# Patient Record
Sex: MALE | Race: WHITE | NOT HISPANIC OR LATINO | Employment: OTHER | URBAN - METROPOLITAN AREA
[De-identification: names, ages, dates, MRNs, and addresses within clinical notes are randomized per-mention and may not be internally consistent; named-entity substitution may affect disease eponyms.]

---

## 2024-05-24 ENCOUNTER — OFFICE VISIT (OUTPATIENT)
Dept: UROLOGY | Facility: CLINIC | Age: 54
End: 2024-05-24
Payer: COMMERCIAL

## 2024-05-24 VITALS
SYSTOLIC BLOOD PRESSURE: 107 MMHG | HEART RATE: 98 BPM | WEIGHT: 176.13 LBS | HEIGHT: 69 IN | BODY MASS INDEX: 26.09 KG/M2 | DIASTOLIC BLOOD PRESSURE: 72 MMHG | RESPIRATION RATE: 16 BRPM

## 2024-05-24 DIAGNOSIS — N40.1 ENLARGED PROSTATE WITH URINARY OBSTRUCTION: ICD-10-CM

## 2024-05-24 DIAGNOSIS — N13.8 ENLARGED PROSTATE WITH URINARY OBSTRUCTION: ICD-10-CM

## 2024-05-24 DIAGNOSIS — R97.20 ELEVATED PSA: Primary | ICD-10-CM

## 2024-05-24 DIAGNOSIS — Z80.42 FAMILY HISTORY OF PROSTATE CANCER: ICD-10-CM

## 2024-05-24 DIAGNOSIS — R35.1 NOCTURIA: ICD-10-CM

## 2024-05-24 LAB
BILIRUB UR QL STRIP: NEGATIVE
GLUCOSE UR QL STRIP: NEGATIVE
KETONES UR QL STRIP: NEGATIVE
LEUKOCYTE ESTERASE UR QL STRIP: NEGATIVE
PH, POC UA: 7
POC BLOOD, URINE: NEGATIVE
POC NITRATES, URINE: NEGATIVE
PROT UR QL STRIP: NEGATIVE
SP GR UR STRIP: 1.02 (ref 1–1.03)
UROBILINOGEN UR STRIP-ACNC: 0.2 (ref 0.3–2.2)

## 2024-05-24 PROCEDURE — 3008F BODY MASS INDEX DOCD: CPT | Mod: CPTII,S$GLB,, | Performed by: UROLOGY

## 2024-05-24 PROCEDURE — 1159F MED LIST DOCD IN RCRD: CPT | Mod: CPTII,S$GLB,, | Performed by: UROLOGY

## 2024-05-24 PROCEDURE — 3078F DIAST BP <80 MM HG: CPT | Mod: CPTII,S$GLB,, | Performed by: UROLOGY

## 2024-05-24 PROCEDURE — 3074F SYST BP LT 130 MM HG: CPT | Mod: CPTII,S$GLB,, | Performed by: UROLOGY

## 2024-05-24 PROCEDURE — 81003 URINALYSIS AUTO W/O SCOPE: CPT | Mod: QW,S$GLB,, | Performed by: UROLOGY

## 2024-05-24 PROCEDURE — 99214 OFFICE O/P EST MOD 30 MIN: CPT | Mod: S$GLB,,, | Performed by: UROLOGY

## 2024-05-24 PROCEDURE — 99999 PR PBB SHADOW E&M-NEW PATIENT-LVL IV: CPT | Mod: PBBFAC,,, | Performed by: UROLOGY

## 2024-05-24 RX ORDER — TRIAMTERENE/HYDROCHLOROTHIAZID 37.5-25 MG
1 TABLET ORAL DAILY
COMMUNITY

## 2024-05-24 RX ORDER — AMLODIPINE BESYLATE 2.5 MG/1
1 TABLET ORAL DAILY
COMMUNITY

## 2024-05-24 RX ORDER — PRAVASTATIN SODIUM 40 MG/1
1 TABLET ORAL DAILY
COMMUNITY
Start: 2024-04-23

## 2024-05-24 RX ORDER — LEVOTHYROXINE SODIUM 88 UG/1
1 TABLET ORAL DAILY
COMMUNITY

## 2024-05-24 NOTE — PROGRESS NOTES
Subjective:       Patient ID: Jonah Grijalva is a 54 y.o. male.    Chief Complaint: Elevated PSA      History of Present Illness:     Mr Grijalva has an elevated PSA that has mildly decreased from last year.  Dr Shrestha is his PCP.  He has never had a prostate MRI or a prostate biopsy done.  He is not taking a prostate medication.  He has a slow urinary stream.  He usually feels that he is emptying his bladder.  Occasional intermittent urinary stream.  Nocturia X 4.  He has family history of prostate cancer in his grandfather.         Past Medical History:   Diagnosis Date    Elevated PSA     Hypertension     Thyroid disease      Family History   Problem Relation Name Age of Onset    Hypertension Father      Alzheimer's disease Father      No Known Problems Mother      No Known Problems Maternal Aunt      No Known Problems Maternal Uncle      No Known Problems Paternal Aunt      No Known Problems Paternal Uncle      Heart disease Paternal Grandmother      No Known Problems Paternal Grandfather       Social History     Socioeconomic History    Marital status:    Tobacco Use    Smoking status: Never    Smokeless tobacco: Never   Substance and Sexual Activity    Alcohol use: Yes     Alcohol/week: 1.0 standard drink of alcohol     Types: 1 Cans of beer per week    Drug use: Never    Sexual activity: Yes     Partners: Female     Birth control/protection: None     Outpatient Encounter Medications as of 5/24/2024   Medication Sig Dispense Refill    amLODIPine (NORVASC) 2.5 MG tablet Take 1 tablet by mouth once daily.      betahistine HCl (BETAHISTINE, BULK, MISC) Betahistine 12 mg TAKE 1 CAPSULE BY MOUTH THREE TIMES DAILY Dispense 90 capsule      levothyroxine (SYNTHROID) 88 MCG tablet Take 1 tablet by mouth once daily.      pravastatin (PRAVACHOL) 40 MG tablet Take 1 tablet by mouth once daily.      triamterene-hydrochlorothiazide 37.5-25 mg (MAXZIDE-25) 37.5-25 mg per tablet Take 1 tablet by mouth once daily.    "    No facility-administered encounter medications on file as of 5/24/2024.        Review of Systems   Constitutional:  Negative for chills and fever.   Respiratory:  Negative for shortness of breath.    Cardiovascular:  Negative for chest pain.   Gastrointestinal:  Negative for nausea and vomiting.   Genitourinary:  Negative for hematuria.   Musculoskeletal:  Negative for back pain.   Skin:  Negative for rash.   Neurological:  Negative for dizziness.   Psychiatric/Behavioral:  Negative for agitation.        Objective:     /72 (BP Location: Right arm, Patient Position: Sitting)   Pulse 98   Resp 16   Ht 5' 9" (1.753 m)   Wt 79.9 kg (176 lb 2.4 oz)   BMI 26.01 kg/m²     Physical Exam  Constitutional:       Appearance: Normal appearance.   Pulmonary:      Effort: Pulmonary effort is normal.   Abdominal:      Palpations: Abdomen is soft.   Genitourinary:     Comments: Prostate exam deferred  Neurological:      Mental Status: He is alert and oriented to person, place, and time.   Psychiatric:         Mood and Affect: Mood normal.         Office Visit on 05/24/2024   Component Date Value Ref Range Status    POC Blood, Urine 05/24/2024 Negative  Negative Final    POC Bilirubin, Urine 05/24/2024 Negative  Negative Final    POC Urobilinogen, Urine 05/24/2024 0.2 (A)  0.3 - 2.2 Final    POC Ketones, Urine 05/24/2024 Negative  Negative Final    POC Protein, Urine 05/24/2024 Negative  Negative Final    POC Nitrates, Urine 05/24/2024 Negative  Negative Final    POC Glucose, Urine 05/24/2024 Negative  Negative Final    pH, UA 05/24/2024 7.0   Final    POC Specific Gravity, Urine 05/24/2024 1.020  1.003 - 1.029 Final    POC Leukocytes, Urine 05/24/2024 Negative  Negative Final        No results found for this or any previous visit (from the past 8760 hour(s)).     Assessment:       1. Elevated PSA    2. Enlarged prostate with urinary obstruction    3. Nocturia    4. Family history of prostate cancer      Plan: "     Orders Placed This Encounter    MRI Prostate W W/O Contrast    PSA, Total and Free    CREATININE, SERUM    POCT Urinalysis, Dipstick, Automated, W/O Scope          3-12-24  PSA 4.750    4-11-23  PSA 4.61    7-22-22  PSA 4.61    7-14-21  PSA 3.70    3-12-24  Cr 1.33.  GFR 63.    3-12-24  HgbA1c 5.6    I reviewed the above lab results.         Assessment:  - Elevated PSA.  - BPH with LUTS that are not too bothersome.  - Nocturia.  - Family history of prostate cancer in his grandfather.     Plan:  - I discussed options with him regarding his elevated PSA and the mutual decision was made to proceed with a prostate MRI.  - I discussed options with him regarding starting him on a prostate medication such as alfuzosin for his BPH with LUTS and he wants to hold off on starting on a prostate medication at this time.  - He was given a lab order today on 5-24-24 for a PSA and a creatinine to both be done on 5-27-24.  He says he wants to do the lab work at his PCP's office.  - Prostate MRI followed by an office visit.

## 2024-06-05 ENCOUNTER — TELEPHONE (OUTPATIENT)
Dept: UROLOGY | Facility: CLINIC | Age: 54
End: 2024-06-05
Payer: COMMERCIAL

## 2024-06-05 DIAGNOSIS — R97.20 ELEVATED PSA: Primary | ICD-10-CM

## 2024-06-05 NOTE — TELEPHONE ENCOUNTER
Labs done by pcp, labs in chart.     ----- Message from Keo Ely sent at 6/5/2024  9:28 AM CDT -----  Good morning,    Can you please put an order in for a STAT creatinine for Mr. Grijalva to complete before his MRI.    Thank you,  Keo  Radiology Dept.

## 2024-06-11 ENCOUNTER — HOSPITAL ENCOUNTER (OUTPATIENT)
Dept: RADIOLOGY | Facility: HOSPITAL | Age: 54
Discharge: HOME | End: 2024-06-11
Attending: UROLOGY
Payer: COMMERCIAL

## 2024-06-11 DIAGNOSIS — R97.20 ELEVATED PSA: ICD-10-CM

## 2024-06-11 PROCEDURE — A9585 GADOBUTROL INJECTION: HCPCS | Mod: PN | Performed by: UROLOGY

## 2024-06-11 PROCEDURE — 25500020 PHARM REV CODE 255: Mod: PN | Performed by: UROLOGY

## 2024-06-11 PROCEDURE — 72197 MRI PELVIS W/O & W/DYE: CPT | Mod: 26,,, | Performed by: RADIOLOGY

## 2024-06-11 PROCEDURE — 72197 MRI PELVIS W/O & W/DYE: CPT | Mod: TC,PN

## 2024-06-11 RX ORDER — GADOBUTROL 604.72 MG/ML
8 INJECTION INTRAVENOUS
Status: COMPLETED | OUTPATIENT
Start: 2024-06-11 | End: 2024-06-11

## 2024-06-11 RX ADMIN — GADOBUTROL 8 ML: 604.72 INJECTION INTRAVENOUS at 01:06

## 2024-06-14 ENCOUNTER — OFFICE VISIT (OUTPATIENT)
Dept: UROLOGY | Facility: CLINIC | Age: 54
End: 2024-06-14
Payer: COMMERCIAL

## 2024-06-14 VITALS
HEIGHT: 69 IN | BODY MASS INDEX: 26.02 KG/M2 | SYSTOLIC BLOOD PRESSURE: 102 MMHG | WEIGHT: 175.69 LBS | HEART RATE: 65 BPM | DIASTOLIC BLOOD PRESSURE: 71 MMHG

## 2024-06-14 DIAGNOSIS — N40.1 BPH WITH OBSTRUCTION/LOWER URINARY TRACT SYMPTOMS: ICD-10-CM

## 2024-06-14 DIAGNOSIS — R97.20 ELEVATED PSA: Primary | ICD-10-CM

## 2024-06-14 DIAGNOSIS — N13.8 BPH WITH OBSTRUCTION/LOWER URINARY TRACT SYMPTOMS: ICD-10-CM

## 2024-06-14 DIAGNOSIS — Z80.42 FAMILY HISTORY OF PROSTATE CANCER: ICD-10-CM

## 2024-06-14 PROCEDURE — 1159F MED LIST DOCD IN RCRD: CPT | Mod: CPTII,S$GLB,, | Performed by: UROLOGY

## 2024-06-14 PROCEDURE — 99999 PR PBB SHADOW E&M-EST. PATIENT-LVL III: CPT | Mod: PBBFAC,,, | Performed by: UROLOGY

## 2024-06-14 PROCEDURE — 99214 OFFICE O/P EST MOD 30 MIN: CPT | Mod: S$GLB,,, | Performed by: UROLOGY

## 2024-06-14 PROCEDURE — 3074F SYST BP LT 130 MM HG: CPT | Mod: CPTII,S$GLB,, | Performed by: UROLOGY

## 2024-06-14 PROCEDURE — 3078F DIAST BP <80 MM HG: CPT | Mod: CPTII,S$GLB,, | Performed by: UROLOGY

## 2024-06-14 PROCEDURE — 3008F BODY MASS INDEX DOCD: CPT | Mod: CPTII,S$GLB,, | Performed by: UROLOGY

## 2024-06-14 NOTE — PROGRESS NOTES
Subjective:       Patient ID: Jonah Grijalva is a 54 y.o. male.    Chief Complaint: Follow-up (/)      History of Present Illness:     Mr Grijalva has an elevated PSA.  Dr Shrestha is his PCP.  He underwent a prostate MRI on 6-11-24 that showed no focal suspicious lesion in the prostate gland to localize a clinically significant prostate cancer.  PI-RADS 2.  Total prostate volume is 54.53 cc.  He has never had a prostate biopsy.  He is not taking a prostate medication.  Occasional slow urinary stream.  He usually feels that he is emptying his bladder.  Occasional intermittent urinary stream.  Nocturia X 4.  He has family history of prostate cancer in his grandfather.              Past Medical History:   Diagnosis Date    Elevated PSA     Hypertension     Thyroid disease      Family History   Problem Relation Name Age of Onset    Hypertension Father      Alzheimer's disease Father      No Known Problems Mother      No Known Problems Maternal Aunt      No Known Problems Maternal Uncle      No Known Problems Paternal Aunt      No Known Problems Paternal Uncle      Heart disease Paternal Grandmother      No Known Problems Paternal Grandfather       Social History     Socioeconomic History    Marital status:    Tobacco Use    Smoking status: Never    Smokeless tobacco: Never   Substance and Sexual Activity    Alcohol use: Yes     Alcohol/week: 1.0 standard drink of alcohol     Types: 1 Cans of beer per week    Drug use: Never    Sexual activity: Yes     Partners: Female     Birth control/protection: None     Outpatient Encounter Medications as of 6/14/2024   Medication Sig Dispense Refill    amLODIPine (NORVASC) 2.5 MG tablet Take 1 tablet by mouth once daily.      betahistine HCl (BETAHISTINE, BULK, MISC) Betahistine 12 mg TAKE 1 CAPSULE BY MOUTH THREE TIMES DAILY Dispense 90 capsule      levothyroxine (SYNTHROID) 88 MCG tablet Take 1 tablet by mouth once daily.      pravastatin (PRAVACHOL) 40 MG tablet Take 1 tablet  "by mouth once daily.      triamterene-hydrochlorothiazide 37.5-25 mg (MAXZIDE-25) 37.5-25 mg per tablet Take 1 tablet by mouth once daily.       No facility-administered encounter medications on file as of 6/14/2024.        Review of Systems   Constitutional:  Negative for chills and fever.   Respiratory:  Negative for shortness of breath.    Cardiovascular:  Negative for chest pain.   Gastrointestinal:  Negative for nausea and vomiting.   Musculoskeletal:  Negative for back pain.   Skin:  Negative for rash.   Neurological:  Negative for dizziness.   Psychiatric/Behavioral:  Negative for agitation.        Objective:     /71 (BP Location: Right arm, Patient Position: Sitting)   Pulse 65   Ht 5' 9" (1.753 m)   Wt 79.7 kg (175 lb 11.3 oz)   BMI 25.95 kg/m²     Physical Exam  Constitutional:       Appearance: Normal appearance.   Pulmonary:      Effort: Pulmonary effort is normal.   Abdominal:      Palpations: Abdomen is soft.   Neurological:      Mental Status: He is alert and oriented to person, place, and time.   Psychiatric:         Mood and Affect: Mood normal.         No visits with results within 1 Day(s) from this visit.   Latest known visit with results is:   Office Visit on 05/24/2024   Component Date Value Ref Range Status    POC Blood, Urine 05/24/2024 Negative  Negative Final    POC Bilirubin, Urine 05/24/2024 Negative  Negative Final    POC Urobilinogen, Urine 05/24/2024 0.2 (A)  0.3 - 2.2 Final    POC Ketones, Urine 05/24/2024 Negative  Negative Final    POC Protein, Urine 05/24/2024 Negative  Negative Final    POC Nitrates, Urine 05/24/2024 Negative  Negative Final    POC Glucose, Urine 05/24/2024 Negative  Negative Final    pH, UA 05/24/2024 7.0   Final    POC Specific Gravity, Urine 05/24/2024 1.020  1.003 - 1.029 Final    POC Leukocytes, Urine 05/24/2024 Negative  Negative Final        No results found for this or any previous visit (from the past 8760 hour(s)).     Assessment:       1. " Elevated PSA    2. BPH with obstruction/lower urinary tract symptoms    3. Family history of prostate cancer      Plan:     Orders Placed This Encounter    PSA, Total and Free          6-11-24  Prostate MRI.  See report.  No focal suspicious lesion in the prostate gland to localize a clinically significant prostate cancer.  PI-RADS 2.  Total prostate volume is 54.53 cc.  No pelvic lymphadenopathy.    I reviewed the above imaging result.         5-28-24  PSA 4.590    3-12-24  PSA 4.750     4-11-23  PSA 4.61     7-22-22  PSA 4.61     7-14-21  PSA 3.70    5-28-24  Cr 1.26.  GFR 67.     3-12-24  Cr 1.33.  GFR 63.     3-12-24  HgbA1c 5.6     I reviewed the above lab results.        Assessment:  - Elevated PSA.  - Prostate MRI on 6-11-24 showed no focal suspicious lesion in the prostate gland to localize a clinically significant prostate cancer.  PI-RADS 2.  Total prostate volume is 54.53 cc.   - BPH with LUTS that are not too bothersome.  - Nocturia.  - Family history of prostate cancer in his grandfather.      Plan:  - I discussed his prostate MRI result with him today.  I explained that a prostate MRI does occasionally miss prostate cancer.  I discussed the option of him undergoing a prostate biopsy.  The mutual decision was made to proceed with observation.  The patient would like to recheck his PSA in 6 months.  - I discussed options with the patient regarding starting him on a prostate medication such as alfuzosin for his BPH with LUTS and he wants to hold off on starting on a prostate medication at this time.  - PSA in 6 months a few days prior to his 6 month appointment.

## 2024-12-16 ENCOUNTER — LAB VISIT (OUTPATIENT)
Dept: LAB | Facility: HOSPITAL | Age: 54
End: 2024-12-16
Attending: UROLOGY
Payer: COMMERCIAL

## 2024-12-16 DIAGNOSIS — R97.20 ELEVATED PSA: ICD-10-CM

## 2024-12-16 LAB
PROSTATE SPECIFIC ANTIGEN, TOTAL: 4.1 NG/ML (ref 0–4)
PSA FREE MFR SERPL: 23.17 %
PSA FREE SERPL-MCNC: 0.95 NG/ML (ref 0–1.5)

## 2024-12-16 PROCEDURE — 36415 COLL VENOUS BLD VENIPUNCTURE: CPT | Mod: PO | Performed by: UROLOGY

## 2024-12-16 PROCEDURE — 84154 ASSAY OF PSA FREE: CPT | Performed by: UROLOGY

## 2024-12-17 ENCOUNTER — PATIENT MESSAGE (OUTPATIENT)
Dept: UROLOGY | Facility: CLINIC | Age: 54
End: 2024-12-17
Payer: COMMERCIAL

## 2024-12-17 ENCOUNTER — TELEPHONE (OUTPATIENT)
Dept: UROLOGY | Facility: CLINIC | Age: 54
End: 2024-12-17
Payer: COMMERCIAL

## 2024-12-17 DIAGNOSIS — R97.20 ELEVATED PSA: Primary | ICD-10-CM

## 2024-12-17 NOTE — TELEPHONE ENCOUNTER
Attempted to contact pt with test results; no answer. Detailed [employer] message sent to pt.  Dianna Nunez LPN  ----- Message from Jordy Nicole MD sent at 12/17/2024  9:47 AM CST -----  I called Mr Grijalva just now and he did not answer the phone and I left him a voice message.  Please call him to verify that he got my voice message that said that I reviewed his PSA and his PSA is mildly elevated at 4.1 but it has improved from his previous PSA of 4.590 in May 2024.  I recommended rechecking his PSA in 6 months.  If he is in agreement, then schedule him an appointment to see me in 6 months and let him know that he can go to an [facility] lab several days prior to this 6 month appointment to recheck his PSA.  Ask him if he has any questions.

## 2025-05-26 ENCOUNTER — TELEPHONE (OUTPATIENT)
Dept: UROLOGY | Facility: CLINIC | Age: 55
End: 2025-05-26
Payer: COMMERCIAL

## 2025-05-26 NOTE — TELEPHONE ENCOUNTER
Outgoing call placed to patient in regards to rescheduling 06/18 appointment due to provider being out of the office. No answer, left vm.

## 2025-07-01 ENCOUNTER — LAB VISIT (OUTPATIENT)
Dept: LAB | Facility: HOSPITAL | Age: 55
End: 2025-07-01
Attending: UROLOGY
Payer: COMMERCIAL

## 2025-07-01 DIAGNOSIS — R97.20 ELEVATED PSA: ICD-10-CM

## 2025-07-01 LAB
PSA FREE MFR SERPL: 27.87 %
PSA FREE SERPL-MCNC: 1.02 NG/ML
PSA SERPL-MCNC: 3.66 NG/ML

## 2025-07-01 PROCEDURE — 36415 COLL VENOUS BLD VENIPUNCTURE: CPT | Mod: PO

## 2025-07-01 PROCEDURE — 84154 ASSAY OF PSA FREE: CPT

## 2025-07-06 ENCOUNTER — RESULTS FOLLOW-UP (OUTPATIENT)
Facility: CLINIC | Age: 55
End: 2025-07-06

## 2025-07-06 DIAGNOSIS — R97.20 ELEVATED PSA: Primary | ICD-10-CM

## 2025-07-07 ENCOUNTER — TELEPHONE (OUTPATIENT)
Dept: UROLOGY | Facility: CLINIC | Age: 55
End: 2025-07-07
Payer: COMMERCIAL

## 2025-07-07 NOTE — TELEPHONE ENCOUNTER
.Outgoing call placed to patient, patient verified identifiers, patient notified of below, he verbalized understanding, appts scheduled and no further assistance needed.      ----- Message from Jordy Nicole MD sent at 7/6/2025  3:49 PM CDT -----  Please call Mr Grijalva and let him know that I reviewed his PSA and it has decreased from 4.1 in December 2024 to 3.66 which is good.  I recommend rechecking his PSA in 6 months.  Ask him if he has   any questions.  If he is good with this plan, then schedule him an appointment to see me in 6 months and schedule him a lab visit to recheck his PSA a few days prior to his 6 month appointment.  ----- Message -----  From: Lab, Background User  Sent: 7/1/2025   9:27 PM CDT  To: Jordy Nicole MD